# Patient Record
(demographics unavailable — no encounter records)

---

## 2025-01-23 NOTE — ASSESSMENT
[FreeTextEntry1] : Impression: Posterior tibial tendonitis. Flatfoot. Pain, right foot.  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 The goal of the device is to improve mobility, improve lower extremity stability, decrease pain and facilitate soft tissue healing. I want him to ice and rest. I gave him an Ace wrap. He will take the Meloxicam once a day with food. I stressed the importance of stretching over time and I want him to get Powerstep arch supports. I will see him back in 2 weeks and make a determination as to treatment going forward.

## 2025-01-23 NOTE — PROCEDURE
[FreeTextEntry1] : X-rays taken to evaluate for fracture or bony  irregularity. X-ray Report; (Right ankle - 2 views, right foot - 2 views) x-rays demonstrate congruous ankle joint with flexible flatfoot. Slight dorsiflexed ray. Navicular cuneiform sag. No accessory navicular or signs of fracture.

## 2025-01-23 NOTE — PHYSICAL EXAM
[2+] : left foot dorsalis pedis 2+ [Sensation] : the sensory exam was normal to light touch and pinprick [No Focal Deficits] : no focal deficits [Deep Tendon Reflexes (DTR)] : deep tendon reflexes were 2+ and symmetric [Motor Exam] : the motor exam was normal [Ankle Swelling (On Exam)] : not present [Varicose Veins Of Lower Extremities] : not present [] : not present [Delayed in the Right Toes] : capillary refills normal in right toes [Delayed in the Left Toes] : capillary refills normal in the left toes [FreeTextEntry3] : Hair growth noted on digits. Proximal to distal cooling is within normal limits.  [de-identified] : He has equinus with tight posterior muscle group. No bony block. He has fully compensated forefoot varus with flexible flatfoot deformity. Patient has no pain over the plantar fascia but he has pain at the posterior tibial tendon at the insertion at the navicular. There is no pain with resisted inversion, but it is sensitive with palpation just towards the insertion with mild swelling.  [FreeTextEntry1] : Minor swelling about the medial rearfoot. [Diminished Throughout Right Foot] : normal sensation with monofilament testing throughout right foot [Diminished Throughout Left Foot] : normal sensation with monofilament testing throughout left foot

## 2025-01-23 NOTE — PHYSICAL EXAM
[2+] : left foot dorsalis pedis 2+ [No Focal Deficits] : no focal deficits [Sensation] : the sensory exam was normal to light touch and pinprick [Deep Tendon Reflexes (DTR)] : deep tendon reflexes were 2+ and symmetric [Motor Exam] : the motor exam was normal [Ankle Swelling (On Exam)] : not present [Varicose Veins Of Lower Extremities] : not present [] : not present [Delayed in the Right Toes] : capillary refills normal in right toes [Delayed in the Left Toes] : capillary refills normal in the left toes [FreeTextEntry3] : Hair growth noted on digits. Proximal to distal cooling is within normal limits.  [de-identified] : He has equinus with tight posterior muscle group. No bony block. He has fully compensated forefoot varus with flexible flatfoot deformity. Patient has no pain over the plantar fascia but he has pain at the posterior tibial tendon at the insertion at the navicular. There is no pain with resisted inversion, but it is sensitive with palpation just towards the insertion with mild swelling.  [FreeTextEntry1] : Minor swelling about the medial rearfoot. [Diminished Throughout Right Foot] : normal sensation with monofilament testing throughout right foot [Diminished Throughout Left Foot] : normal sensation with monofilament testing throughout left foot

## 2025-01-23 NOTE — PHYSICAL EXAM
[2+] : left foot dorsalis pedis 2+ [Sensation] : the sensory exam was normal to light touch and pinprick [No Focal Deficits] : no focal deficits [Deep Tendon Reflexes (DTR)] : deep tendon reflexes were 2+ and symmetric [Motor Exam] : the motor exam was normal [Ankle Swelling (On Exam)] : not present [Varicose Veins Of Lower Extremities] : not present [] : not present [Delayed in the Right Toes] : capillary refills normal in right toes [Delayed in the Left Toes] : capillary refills normal in the left toes [FreeTextEntry3] : Hair growth noted on digits. Proximal to distal cooling is within normal limits.  [de-identified] : He has equinus with tight posterior muscle group. No bony block. He has fully compensated forefoot varus with flexible flatfoot deformity. Patient has no pain over the plantar fascia but he has pain at the posterior tibial tendon at the insertion at the navicular. There is no pain with resisted inversion, but it is sensitive with palpation just towards the insertion with mild swelling.  [FreeTextEntry1] : Minor swelling about the medial rearfoot. [Diminished Throughout Right Foot] : normal sensation with monofilament testing throughout right foot [Diminished Throughout Left Foot] : normal sensation with monofilament testing throughout left foot

## 2025-01-23 NOTE — HISTORY OF PRESENT ILLNESS
[FreeTextEntry1] : Patient presents today because of pain in the right foot and ankle. He has a flatfoot, and he has been training for a marathon. He does not recall injuring the foot but is overusing it and presents today with a very flexible flimsy type shoe. He complains of pain that is 4/10. It has been going on for 2 weeks.

## 2025-02-10 NOTE — HISTORY OF PRESENT ILLNESS
[FreeTextEntry1] : Patient presents today for flatfoot with PT tendonitis. He has been in a CAM boot and is dramatically improved.

## 2025-02-10 NOTE — PHYSICAL EXAM
[2+] : left foot dorsalis pedis 2+ [Sensation] : the sensory exam was normal to light touch and pinprick [No Focal Deficits] : no focal deficits [Deep Tendon Reflexes (DTR)] : deep tendon reflexes were 2+ and symmetric [Motor Exam] : the motor exam was normal [Ankle Swelling (On Exam)] : not present [Varicose Veins Of Lower Extremities] : not present [] : not present [Delayed in the Right Toes] : capillary refills normal in right toes [Delayed in the Left Toes] : capillary refills normal in the left toes [FreeTextEntry3] : Hair growth noted on digits. Proximal to distal cooling is within normal limits.  [de-identified] : There is really no pain with palpation or resisted inversion, but he does have a fully compensated flatfoot. [Diminished Throughout Right Foot] : normal sensation with monofilament testing throughout right foot [Diminished Throughout Left Foot] : normal sensation with monofilament testing throughout left foot

## 2025-02-10 NOTE — PHYSICAL EXAM
[2+] : left foot dorsalis pedis 2+ [Sensation] : the sensory exam was normal to light touch and pinprick [No Focal Deficits] : no focal deficits [Deep Tendon Reflexes (DTR)] : deep tendon reflexes were 2+ and symmetric [Motor Exam] : the motor exam was normal [Ankle Swelling (On Exam)] : not present [Varicose Veins Of Lower Extremities] : not present [] : not present [Delayed in the Right Toes] : capillary refills normal in right toes [Delayed in the Left Toes] : capillary refills normal in the left toes [FreeTextEntry3] : Hair growth noted on digits. Proximal to distal cooling is within normal limits.  [de-identified] : There is really no pain with palpation or resisted inversion, but he does have a fully compensated flatfoot. [Diminished Throughout Right Foot] : normal sensation with monofilament testing throughout right foot [Diminished Throughout Left Foot] : normal sensation with monofilament testing throughout left foot

## 2025-02-10 NOTE — ASSESSMENT
[FreeTextEntry1] : Impression: Posterior tibial tendonitis. Flatfoot.   Treatment: I want him to get Powerstep arch supports, continue to stretch and ice after exercising.  I also referred him for potential custom orthotics if it is covered through the insurance. He will follow-up in the office for evaluation as needed.

## 2025-02-10 NOTE — PHYSICAL EXAM
[2+] : left foot dorsalis pedis 2+ [Sensation] : the sensory exam was normal to light touch and pinprick [No Focal Deficits] : no focal deficits [Deep Tendon Reflexes (DTR)] : deep tendon reflexes were 2+ and symmetric [Motor Exam] : the motor exam was normal [Ankle Swelling (On Exam)] : not present [Varicose Veins Of Lower Extremities] : not present [] : not present [Delayed in the Right Toes] : capillary refills normal in right toes [Delayed in the Left Toes] : capillary refills normal in the left toes [FreeTextEntry3] : Hair growth noted on digits. Proximal to distal cooling is within normal limits.  [de-identified] : There is really no pain with palpation or resisted inversion, but he does have a fully compensated flatfoot. [Diminished Throughout Right Foot] : normal sensation with monofilament testing throughout right foot [Diminished Throughout Left Foot] : normal sensation with monofilament testing throughout left foot